# Patient Record
Sex: FEMALE | Race: WHITE | NOT HISPANIC OR LATINO | Employment: UNEMPLOYED | URBAN - METROPOLITAN AREA
[De-identification: names, ages, dates, MRNs, and addresses within clinical notes are randomized per-mention and may not be internally consistent; named-entity substitution may affect disease eponyms.]

---

## 2017-05-23 DIAGNOSIS — Z12.31 ENCOUNTER FOR SCREENING MAMMOGRAM FOR MALIGNANT NEOPLASM OF BREAST: ICD-10-CM

## 2017-05-23 DIAGNOSIS — R10.2 PELVIC AND PERINEAL PAIN: ICD-10-CM

## 2017-06-05 ENCOUNTER — ALLSCRIPTS OFFICE VISIT (OUTPATIENT)
Dept: OTHER | Facility: OTHER | Age: 47
End: 2017-06-05

## 2017-06-12 ENCOUNTER — GENERIC CONVERSION - ENCOUNTER (OUTPATIENT)
Dept: OTHER | Facility: OTHER | Age: 47
End: 2017-06-12

## 2017-07-25 ENCOUNTER — ALLSCRIPTS OFFICE VISIT (OUTPATIENT)
Dept: OTHER | Facility: OTHER | Age: 47
End: 2017-07-25

## 2017-08-03 ENCOUNTER — ALLSCRIPTS OFFICE VISIT (OUTPATIENT)
Dept: OTHER | Facility: OTHER | Age: 47
End: 2017-08-03

## 2017-08-04 ENCOUNTER — GENERIC CONVERSION - ENCOUNTER (OUTPATIENT)
Dept: OTHER | Facility: OTHER | Age: 47
End: 2017-08-04

## 2017-08-15 ENCOUNTER — GENERIC CONVERSION - ENCOUNTER (OUTPATIENT)
Dept: OTHER | Facility: OTHER | Age: 47
End: 2017-08-15

## 2017-08-25 DIAGNOSIS — Z00.00 ENCOUNTER FOR GENERAL ADULT MEDICAL EXAMINATION WITHOUT ABNORMAL FINDINGS: ICD-10-CM

## 2017-08-25 DIAGNOSIS — K91.2 POSTSURGICAL MALABSORPTION, NOT ELSEWHERE CLASSIFIED: ICD-10-CM

## 2017-08-25 DIAGNOSIS — R12 HEARTBURN: ICD-10-CM

## 2017-08-25 DIAGNOSIS — R14.0 ABDOMINAL DISTENSION (GASEOUS): ICD-10-CM

## 2017-08-25 DIAGNOSIS — Z98.84 BARIATRIC SURGERY STATUS: ICD-10-CM

## 2017-11-10 ENCOUNTER — GENERIC CONVERSION - ENCOUNTER (OUTPATIENT)
Dept: OTHER | Facility: OTHER | Age: 47
End: 2017-11-10

## 2017-11-10 ENCOUNTER — APPOINTMENT (OUTPATIENT)
Dept: LAB | Facility: MEDICAL CENTER | Age: 47
End: 2017-11-10
Payer: MEDICARE

## 2017-11-10 DIAGNOSIS — Z13.228 ENCOUNTER FOR SCREENING FOR OTHER METABOLIC DISORDERS: ICD-10-CM

## 2017-11-10 DIAGNOSIS — Z13.0 ENCOUNTER FOR SCREENING FOR DISEASES OF THE BLOOD AND BLOOD-FORMING ORGANS AND CERTAIN DISORDERS INVOLVING THE IMMUNE MECHANISM: ICD-10-CM

## 2017-11-10 DIAGNOSIS — B37.3 CANDIDIASIS OF VULVA AND VAGINA: ICD-10-CM

## 2017-11-10 DIAGNOSIS — Z13.21 ENCOUNTER FOR SCREENING FOR NUTRITIONAL DISORDER: ICD-10-CM

## 2017-11-10 DIAGNOSIS — Z13.29 ENCOUNTER FOR SCREENING FOR OTHER SUSPECTED ENDOCRINE DISORDER: ICD-10-CM

## 2017-11-10 LAB
EST. AVERAGE GLUCOSE BLD GHB EST-MCNC: 117 MG/DL
HBA1C MFR BLD: 5.7 % (ref 4.2–6.3)

## 2017-11-10 PROCEDURE — 36415 COLL VENOUS BLD VENIPUNCTURE: CPT

## 2017-11-10 PROCEDURE — 83036 HEMOGLOBIN GLYCOSYLATED A1C: CPT

## 2017-12-06 ENCOUNTER — LAB REQUISITION (OUTPATIENT)
Dept: LAB | Facility: HOSPITAL | Age: 47
End: 2017-12-06
Payer: COMMERCIAL

## 2017-12-06 ENCOUNTER — APPOINTMENT (OUTPATIENT)
Dept: LAB | Facility: MEDICAL CENTER | Age: 47
End: 2017-12-06
Payer: COMMERCIAL

## 2017-12-06 ENCOUNTER — TRANSCRIBE ORDERS (OUTPATIENT)
Dept: ADMINISTRATIVE | Facility: HOSPITAL | Age: 47
End: 2017-12-06

## 2017-12-06 ENCOUNTER — GENERIC CONVERSION - ENCOUNTER (OUTPATIENT)
Dept: OTHER | Facility: OTHER | Age: 47
End: 2017-12-06

## 2017-12-06 DIAGNOSIS — R19.4 FREQUENT BOWEL MOVEMENTS: Primary | ICD-10-CM

## 2017-12-06 DIAGNOSIS — B37.9 CANDIDIASIS: ICD-10-CM

## 2017-12-06 DIAGNOSIS — N76.0 ACUTE VAGINITIS: ICD-10-CM

## 2017-12-06 DIAGNOSIS — B37.3 CANDIDIASIS OF VULVA AND VAGINA: ICD-10-CM

## 2017-12-06 DIAGNOSIS — R19.4 FREQUENT BOWEL MOVEMENTS: ICD-10-CM

## 2017-12-06 LAB
CRP SERPL QL: 9.5 MG/L
TSH SERPL DL<=0.05 MIU/L-ACNC: 3.23 UIU/ML (ref 0.36–3.74)

## 2017-12-06 PROCEDURE — 84443 ASSAY THYROID STIM HORMONE: CPT

## 2017-12-06 PROCEDURE — 86255 FLUORESCENT ANTIBODY SCREEN: CPT

## 2017-12-06 PROCEDURE — 87510 GARDNER VAG DNA DIR PROBE: CPT | Performed by: NURSE PRACTITIONER

## 2017-12-06 PROCEDURE — 86140 C-REACTIVE PROTEIN: CPT

## 2017-12-06 PROCEDURE — 82784 ASSAY IGA/IGD/IGG/IGM EACH: CPT

## 2017-12-06 PROCEDURE — 87660 TRICHOMONAS VAGIN DIR PROBE: CPT | Performed by: NURSE PRACTITIONER

## 2017-12-06 PROCEDURE — 36415 COLL VENOUS BLD VENIPUNCTURE: CPT

## 2017-12-06 PROCEDURE — 83516 IMMUNOASSAY NONANTIBODY: CPT

## 2017-12-06 PROCEDURE — 87480 CANDIDA DNA DIR PROBE: CPT | Performed by: NURSE PRACTITIONER

## 2017-12-07 LAB
ENDOMYSIUM IGA SER QL: NEGATIVE
GLIADIN PEPTIDE IGA SER-ACNC: 4 UNITS (ref 0–19)
GLIADIN PEPTIDE IGG SER-ACNC: 1 UNITS (ref 0–19)
IGA SERPL-MCNC: 177 MG/DL (ref 87–352)
TTG IGA SER-ACNC: <2 U/ML (ref 0–3)
TTG IGG SER-ACNC: <2 U/ML (ref 0–5)

## 2017-12-08 LAB
CANDIDA RRNA VAG QL PROBE: NEGATIVE
G VAGINALIS RRNA GENITAL QL PROBE: POSITIVE
T VAGINALIS RRNA GENITAL QL PROBE: NEGATIVE

## 2017-12-11 ENCOUNTER — GENERIC CONVERSION - ENCOUNTER (OUTPATIENT)
Dept: OTHER | Facility: OTHER | Age: 47
End: 2017-12-11

## 2017-12-20 ENCOUNTER — GENERIC CONVERSION - ENCOUNTER (OUTPATIENT)
Dept: OTHER | Facility: OTHER | Age: 47
End: 2017-12-20

## 2018-01-09 NOTE — PROGRESS NOTES
Message  Outreach phone call; no response but left message for return phone call  Would like to schedule appointment with CHAPITO and RD  NV      Active Problems    1  Abdominal bloating (787 3) (R14 0)   2  Anxiety (300 00) (F41 9)   3  Bipolar disorder (296 80) (F31 9)   4  Blood pressure elevated (401 9) (I10)   5  Candidiasis (112 9) (B37 9)   6  Depression (311) (F32 9)   7  Disc degeneration, lumbar (722 52) (M51 36)   8  Encounter for gynecological examination without abnormal finding (V72 31) (Z01 419)   9  Encounter for mammogram to establish baseline mammogram (V76 12) (Z12 31)   10  Encounter for screening mammogram for malignant neoplasm of breast (V76 12)    (Z12 31)   11  Esophageal reflux (530 81) (K21 9)   12  Fatigue (780 79) (R53 83)   13  Fibromyalgia (729 1) (M79 7)   14  Hearing Loss (389 9)   15  Heart burn (787 1) (R12)   16  Hypothyroidism (244 9) (E03 9)   17  Incisional irritation (998 89) (T81 89XA)   18  Joint pain, knee (719 46) (M25 569)   19  Lichen sclerosus (408 7) (L90 0)   20  Obesity (278 00) (E66 9)   21  Obesity surgery status (V45 86) (Z98 84)   22  Pilonidal cyst (685 1) (L05 91)   23  Postgastrectomy malabsorption (579 3) (K91 2,Z90 3)   24  Tingling (782 0) (R20 2)   25  Varicose Veins Of Lower Extremities (454 9)   26  Weight gain (783 1) (R63 5)    Current Meds   1  ALPRAZolam 0 25 MG Oral Tablet; 1-4 daily prn; Therapy: (Recorded:28Plp2560) to Recorded   2  Biotin TABS; Therapy: (Recorded:77Yoc0504) to Recorded   3  Calcium 600+D 600-400 MG-UNIT Oral Tablet Recorded   4  Clobetasol Propionate 0 05 % External Ointment; APPLY AND GENTLY MASSAGE INTO   AFFECTED AREA(S) TWICE DAILY; Therapy: 82CII6427 to (Last Rx:86Bsb3378)  Requested for: 45YQL1791 Ordered   5  [facility] Miconazole 7 2 % Vaginal Cream;   Therapy: 86OZJ5908 to Recorded   6  Dexilant 60 MG Oral Capsule Delayed Release; TAKE 1 CAPSULE DAILY EVERY   MORNING BEFORE BREAKFAST;    Therapy: 42JNM9810 to (Complete:10Utp5115)  Requested for: 04IRB9346; Last   Rx:25Jul2017 Ordered   7  Fluconazole 150 MG Oral Tablet (Diflucan); diflucan 150mg,,,take 1 today and repeat in   3 days; Therapy: 13RKN8918 to (Last Rx:05Jun2017)  Requested for: 94ABL1852 Ordered   8  HydroCHLOROthiazide 25 MG Oral Tablet; One Daily; Therapy: (Recorded:25Nov2013) to Recorded   9  Multivitamins CAPS; Therapy: (Recorded:19Dec2013) to Recorded   10  Nystatin-Triamcinolone 516172-6 1 UNIT/GM-% External Ointment; APPLY SPARINGLY    TO AFFECTED AREA(S) TWICE DAILY; Therapy: 19BJX3446 to (Last Rx:05Jun2017)  Requested for: 97TIY4986    Ordered   11  Omega 3 CAPS; Therapy: (Recorded:25Jul2017) to Recorded   12  Sertraline HCl - 50 MG Oral Tablet; TAKE 1 TABLET DAILY; Therapy: (Recorded:25Nov2013) to Recorded   13  Tramadol-Acetaminophen 37 5-325 MG Oral Tablet; Therapy: 12Apr2017 to (Evaluate:05Jul2017) Recorded   14  Vitamin B-12 SUBL; Therapy: (Recorded:19Dec2013) to Recorded   15  Vitamin C TABS; Therapy: (Recorded:11Mar2015) to Recorded   16  Vitamin D3 TABS; Therapy: (Recorded:11Mar2015) to Recorded    Allergies    1   Penicillins    Signatures   Electronically signed by : ANDREA Govea; Aug 15 2017  3:07PM EST                       (Author)

## 2018-01-10 NOTE — PROGRESS NOTES
Message  Outreach phone call; patient upset because she was not seen by RD on 8/3/17, and she has to wait 3 months to meet with Dr Santos Arriola or PA  She is unsure if she will continue with [facility]  She is considering going to [facility]  I apologized to patient for the incident and made myself available however patient was in no place to hear anything from me  [place]      Active Problems    1  Abdominal bloating (787 3) (R14 0)   2  Anxiety (300 00) (F41 9)   3  Bipolar disorder (296 80) (F31 9)   4  Blood pressure elevated (401 9) (I10)   5  Candidiasis (112 9) (B37 9)   6  Depression (311) (F32 9)   7  Disc degeneration, lumbar (722 52) (M51 36)   8  Encounter for gynecological examination without abnormal finding (V72 31) (Z01 419)   9  Encounter for mammogram to establish baseline mammogram (V76 12) (Z12 31)   10  Encounter for screening mammogram for malignant neoplasm of breast (V76 12)    (Z12 31)   11  Esophageal reflux (530 81) (K21 9)   12  Fatigue (780 79) (R53 83)   13  Fibromyalgia (729 1) (M79 7)   14  Hearing Loss (389 9)   15  Heart burn (787 1) (R12)   16  Hypothyroidism (244 9) (E03 9)   17  Incisional irritation (998 89) (T81 89XA)   18  Joint pain, knee (719 46) (M25 569)   19  Lichen sclerosus (247 5) (L90 0)   20  Obesity (278 00) (E66 9)   21  Obesity surgery status (V45 86) (Z98 84)   22  Pilonidal cyst (685 1) (L05 91)   23  Postgastrectomy malabsorption (579 3) (K91 2,Z90 3)   24  Tingling (782 0) (R20 2)   25  Varicose Veins Of Lower Extremities (454 9)   26  Weight gain (783 1) (R63 5)    Current Meds   1  ALPRAZolam 0 25 MG Oral Tablet; 1-4 daily prn; Therapy: (Recorded:32Jjf6225) to Recorded   2  Biotin TABS; Therapy: (Recorded:29Ujt4964) to Recorded   3  Calcium 600+D 600-400 MG-UNIT Oral Tablet Recorded   4  Clobetasol Propionate 0 05 % External Ointment; APPLY AND GENTLY MASSAGE INTO   AFFECTED AREA(S) TWICE DAILY;    Therapy: 90NUL9946 to (Last SF:29COV4406)  Requested for: 57OFX4451 Ordered   5  [facility] Miconazole 7 2 % Vaginal Cream;   Therapy: 74PTM5355 to Recorded   6  Dexilant 60 MG Oral Capsule Delayed Release; TAKE 1 CAPSULE DAILY EVERY   MORNING BEFORE BREAKFAST; Therapy: 33VMR5974 to (Complete:08Xcv8452)  Requested for: 50YEK9521; Last   Rx:25Jul2017 Ordered   7  Fluconazole 150 MG Oral Tablet (Diflucan); diflucan 150mg,,,take 1 today and repeat in   3 days; Therapy: 38QDF8318 to (Last Rx:05Jun2017)  Requested for: 20ENE0275 Ordered   8  HydroCHLOROthiazide 25 MG Oral Tablet; One Daily; Therapy: (Recorded:25Nov2013) to Recorded   9  Multivitamins CAPS; Therapy: (Recorded:19Dec2013) to Recorded   10  Nystatin-Triamcinolone 170069-8 1 UNIT/GM-% External Ointment; APPLY SPARINGLY    TO AFFECTED AREA(S) TWICE DAILY; Therapy: 43ZZS7629 to (Last Rx:05Jun2017)  Requested for: 59SBY9072    Ordered   11  Omega 3 CAPS; Therapy: (Recorded:25Jul2017) to Recorded   12  Sertraline HCl - 50 MG Oral Tablet; TAKE 1 TABLET DAILY; Therapy: (Recorded:25Nov2013) to Recorded   13  Tramadol-Acetaminophen 37 5-325 MG Oral Tablet; Therapy: 12Apr2017 to (Evaluate:05Jul2017) Recorded   14  Vitamin B-12 SUBL; Therapy: (Recorded:19Dec2013) to Recorded   15  Vitamin C TABS; Therapy: (Recorded:11Mar2015) to Recorded   16  Vitamin D3 TABS; Therapy: (Recorded:11Mar2015) to Recorded    Allergies    1   Penicillins    Signatures   Electronically signed by : ANDREA Sherman; Aug  4 2017  2:47PM EST                       (Author)

## 2018-01-12 NOTE — MISCELLANEOUS
Provider Comments  Provider Comments:     Dear Rosalva Gaviria had a scheduled appointment at our office today but were unable to keep  We attempted to call you back but were unable to reach you  It is very important that you follow up with us so that we can assess your physical and nutritional safety after your surgery  Please call our office at [phone] to reschedule your appointment       Sincerely,     [facility]              Signatures   Electronically signed by : Kan Uriarte, ; Mar 14 2016  9:01AM EST                       (Author)    Electronically signed by : Mart Hanks, [facility]; Mar 14 2016  2:23PM EST                       (Author)

## 2018-01-13 VITALS
HEART RATE: 72 BPM | BODY MASS INDEX: 35.84 KG/M2 | WEIGHT: 223 LBS | HEIGHT: 66 IN | RESPIRATION RATE: 20 BRPM | SYSTOLIC BLOOD PRESSURE: 102 MMHG | DIASTOLIC BLOOD PRESSURE: 70 MMHG | TEMPERATURE: 97 F

## 2018-01-13 VITALS
HEIGHT: 66 IN | DIASTOLIC BLOOD PRESSURE: 80 MMHG | WEIGHT: 220 LBS | BODY MASS INDEX: 35.36 KG/M2 | SYSTOLIC BLOOD PRESSURE: 110 MMHG

## 2018-01-13 VITALS
RESPIRATION RATE: 22 BRPM | HEIGHT: 66 IN | DIASTOLIC BLOOD PRESSURE: 78 MMHG | WEIGHT: 225.5 LBS | HEART RATE: 74 BPM | SYSTOLIC BLOOD PRESSURE: 104 MMHG | BODY MASS INDEX: 36.24 KG/M2 | TEMPERATURE: 97.2 F

## 2018-01-15 NOTE — MISCELLANEOUS
Message   Recorded as Task   Date: 04/15/2016 10:26 AM, Created By: Eduarda Tellez   Task Name: Follow Up   Assigned To: Alo Pan   Regarding Patient: Aj Talbot, Status: In Progress   Comment:    Naima Alonso - 15 Apr 2016 10:26 AM     TASK CREATED  Caller: Self; [phone] (Home)  pt has had pain and pressure w/ urinating and itching/burning    Joanne Folk now is getting sore please advise pt @ [phone] rx [facility]   LindaAguila - 15 Apr 2016 10:46 AM     TASK IN PROGRESS   LindaAguila - 15 Apr 2016 11:05 AM     TASK EDITED  spoke with pt- advised she go to [facility] today to r/o UTI   annual exam with artur green -[place] office, per pts' request   (pt has not been seen since 12/2014), due for an annual & has gyn questions/concerns        Active Problems    1  Anxiety (300 00) (F41 9)   2  Bipolar disorder (296 80) (F31 9)   3  Blood pressure elevated (401 9) (I10)   4  Candidiasis (112 9) (B37 9)   5  Depression (311) (F32 9)   6  Disc degeneration, lumbar (722 52) (M51 36)   7  Encounter for mammogram to establish baseline mammogram (V76 12) (Z12 31)   8  Encounter for routine gynecological examination (V72 31) (Z01 419)   9  Encounter for screening mammogram for malignant neoplasm of breast (V76 12)   (Z12 31)   10  Esophageal reflux (530 81) (K21 9)   11  Fatigue (780 79) (R53 83)   12  Furuncle of groin (680 2) (L02 224)   13  Hearing Loss (389 9)   14  Hypothyroidism (244 9) (E03 9)   15  Incisional irritation (998 89) (T81 89XA)   16  Joint pain, knee (719 46) (M25 569)   17  Obesity (278 00) (E66 9)   18  Obesity surgery status (V45 86) (Z98 84)   19  Painful urination (788 1) (R30 9)   20  Pilonidal cyst (685 1) (L05 91)   21  Postgastrectomy malabsorption (579 3) (K91 2,Z90 3)   22  Pre-operative cardiovascular examination (V72 81) (Z01 810)   23  Tingling (782 0) (R20 2)   24  Umbilical discharge (923 2) (R19 8)   25   Varicose Veins Of Lower Extremities (454 9) 26  Vulvitis (616 10) (N76 2)    Current Meds   1  ALPRAZolam 0 25 MG Oral Tablet; 1-4 daily prn; Therapy: (Recorded:25Nov2013) to Recorded   2  Butalbital-APAP-Caffeine -40 MG Oral Tablet; Therapy: 23WGE1086 to (Evaluate:16Mar2015) Recorded   3  Calcium 600+D 600-400 MG-UNIT Oral Tablet Recorded   4  Folic Acid TABS; Therapy: (Recorded:11Mar2015) to Recorded   5  Hydrochlorothiazide 25 MG Oral Tablet; One Daily; Therapy: (Recorded:25Nov2013) to Recorded   6  Magnesium CAPS; Therapy: (Recorded:11Mar2015) to Recorded   7  Multivitamins CAPS; Therapy: (Recorded:19Dec2013) to Recorded   8  Omeprazole 20 MG Oral Capsule Delayed Release; TAKE 1 CAPSULE DAILY; Therapy: 29WTM6710 to (Evaluate:11Suz7506)  Requested for: 86AUR3640; Last   Rx:90Vpo7913 Ordered   9  Sertraline HCl - 100 MG Oral Tablet; Therapy: 35YUR1773 to (Evaluate:63Lnu1946) Recorded   10  Sertraline HCl - 50 MG Oral Tablet; TAKE 1 TABLET DAILY; Therapy: (Recorded:25Nov2013) to Recorded   11  Vitamin B-12 SUBL; Therapy: (Recorded:19Dec2013) to Recorded   12  Vitamin C TABS; Therapy: (Recorded:11Mar2015) to Recorded   13  Vitamin D3 TABS; Therapy: (Recorded:11Mar2015) to Recorded    Allergies    1   Penicillins    Signatures   Electronically signed by : Cherylene Hang, RN; Apr 15 2016 11:05AM EST                       (Author)

## 2018-01-16 NOTE — MISCELLANEOUS
Message   Recorded as Task   Date: 05/23/2017 02:03 PM, Created By: Mallorie Sagastume   Task Name: Call Back   Assigned To: Brionna Figueroa   Regarding Patient: Bhavin Lin, Status: In Progress   Comment:    Shari Thorne - 23 May 2017 2:03 PM     TASK CREATED  PT WANTS AN UPDATED SCRIPT FOR A PELVIC U/C NON OB COMPLETE BUT SHE'S ASKING TO ALSO HAVE A TRANSVAGINAL ONE  Eulalio Rodriguez ORDERED BUT I DON'T SEE THIS IN THE SYSTEM  SHE'S GOING TO [facility] -[phone]  HER # [phone]   Sherri Leon - 23 May 2017 2:04 PM     TASK IN PROGRESS   Sherri Leon - 23 May 2017 2:20 PM     TASK EDITED  order placed in allscripts for pelvic u/s with transvaginal  slip sent to pt        Active Problems    1  Anxiety (300 00) (F41 9)   2  Bipolar disorder (296 80) (F31 9)   3  Blood pressure elevated (401 9) (I10)   4  Candidiasis (112 9) (B37 9)   5  Depression (311) (F32 9)   6  Disc degeneration, lumbar (722 52) (M51 36)   7  Encounter for mammogram to establish baseline mammogram (V76 12) (Z12 31)   8  Encounter for routine gynecological examination (V72 31) (Z01 419)   9  Encounter for screening mammogram for malignant neoplasm of breast (V76 12)   (Z12 31)   10  Esophageal reflux (530 81) (K21 9)   11  Fatigue (780 79) (R53 83)   12  Furuncle of groin (680 2) (L02 224)   13  Hearing Loss (389 9)   14  Hypothyroidism (244 9) (E03 9)   15  Incisional irritation (998 89) (T81 89XA)   16  Joint pain, knee (719 46) (M25 569)   17  Lichen sclerosus (052 1) (L90 0)   18  Obesity (278 00) (E66 9)   19  Obesity surgery status (V45 86) (Z98 84)   20  Painful urination (788 1) (R30 9)   21  Pelvic pain in female (625 9) (R10 2)   22  Pilonidal cyst (685 1) (L05 91)   23  Postgastrectomy malabsorption (579 3) (K91 2,Z90 3)   24  Pre-operative cardiovascular examination (V72 81) (Z01 810)   25  Tingling (782 0) (R20 2)   26  Umbilical discharge (303 9) (R19 8)   27  Varicose Veins Of Lower Extremities (454 9)   28   Vulvitis (616 10) (N76 2)    Current Meds   1  ALPRAZolam 0 25 MG Oral Tablet; 1-4 daily prn; Therapy: (Recorded:25Nov2013) to Recorded   2  Butalbital-APAP-Caffeine -40 MG Oral Tablet; Therapy: 87JSI4604 to (Evaluate:16Mar2015) Recorded   3  Calcium 600+D 600-400 MG-UNIT Oral Tablet Recorded   4  Clobetasol Propionate 0 05 % External Ointment; APPLY AND GENTLY MASSAGE INTO   AFFECTED AREA(S) TWICE DAILY; Therapy: 29DYU7227 to (Last Rx:75Hdk9017)  Requested for: 99NYP7688 Ordered   5  Folic Acid TABS; Therapy: (Recorded:11Mar2015) to Recorded   6  HydroCHLOROthiazide 25 MG Oral Tablet; One Daily; Therapy: (Recorded:25Nov2013) to Recorded   7  Multivitamins CAPS; Therapy: (Recorded:19Dec2013) to Recorded   8  Omeprazole 20 MG Oral Capsule Delayed Release; TAKE 1 CAPSULE DAILY; Therapy: 87QVE4082 to (Evaluate:70Vby6233)  Requested for: 02ZAQ4660; Last   Rx:34Tak1201 Ordered   9  Sertraline HCl - 50 MG Oral Tablet; TAKE 1 TABLET DAILY; Therapy: (Recorded:25Nov2013) to Recorded   10  Vitamin B-12 SUBL; Therapy: (Recorded:19Dec2013) to Recorded   11  Vitamin C TABS; Therapy: (Recorded:11Mar2015) to Recorded   12  Vitamin D3 TABS; Therapy: (Recorded:11Mar2015) to Recorded    Allergies    1  Penicillins    Plan  Pelvic pain in female    · * US PELVIS COMPLETE (TRANSABDOMINAL AND TRANSVAGINAL); Status:Hold For -  Scheduling,Retrospective By Protocol Authorization;  Requested for:21Ayu9006;     Signatures   Electronically signed by : Osman Arias, ; May 23 2017  2:20PM EST                       (Author)

## 2018-01-17 NOTE — MISCELLANEOUS
Message   Recorded as Task   Date: 05/23/2017 02:25 PM, Created By: Elizabeth Arroyo   Task Name: Call Back   Assigned To: Petra Jj   Regarding Patient: Rl Todd, Status: Active   Comment:    Sherri Leon - 23 May 2017 2:25 PM     TASK CREATED  slip for pelvic u/s faxed to [facility] at [phone]        Active Problems    1  Anxiety (300 00) (F41 9)   2  Bipolar disorder (296 80) (F31 9)   3  Blood pressure elevated (401 9) (I10)   4  Candidiasis (112 9) (B37 9)   5  Depression (311) (F32 9)   6  Disc degeneration, lumbar (722 52) (M51 36)   7  Encounter for mammogram to establish baseline mammogram (V76 12) (Z12 31)   8  Encounter for routine gynecological examination (V72 31) (Z01 419)   9  Encounter for screening mammogram for malignant neoplasm of breast (V76 12)   (Z12 31)   10  Esophageal reflux (530 81) (K21 9)   11  Fatigue (780 79) (R53 83)   12  Furuncle of groin (680 2) (L02 224)   13  Hearing Loss (389 9)   14  Hypothyroidism (244 9) (E03 9)   15  Incisional irritation (998 89) (T81 89XA)   16  Joint pain, knee (719 46) (M25 569)   17  Lichen sclerosus (061 0) (L90 0)   18  Obesity (278 00) (E66 9)   19  Obesity surgery status (V45 86) (Z98 84)   20  Painful urination (788 1) (R30 9)   21  Pelvic pain in female (625 9) (R10 2)   22  Pilonidal cyst (685 1) (L05 91)   23  Postgastrectomy malabsorption (579 3) (K91 2,Z90 3)   24  Pre-operative cardiovascular examination (V72 81) (Z01 810)   25  Tingling (782 0) (R20 2)   26  Umbilical discharge (578 9) (R19 8)   27  Varicose Veins Of Lower Extremities (454 9)   28  Vulvitis (616 10) (N76 2)    Current Meds   1  ALPRAZolam 0 25 MG Oral Tablet; 1-4 daily prn; Therapy: (Recorded:25Nov2013) to Recorded   2  Butalbital-APAP-Caffeine -40 MG Oral Tablet; Therapy: 26YTD6936 to (Evaluate:16Mar2015) Recorded   3  Calcium 600+D 600-400 MG-UNIT Oral Tablet Recorded   4   Clobetasol Propionate 0 05 % External Ointment; APPLY AND GENTLY MASSAGE INTO AFFECTED AREA(S) TWICE DAILY; Therapy: 41KMJ0703 to (Last Rx:54Cdv1969)  Requested for: 93ZNY3586 Ordered   5  Folic Acid TABS; Therapy: (Recorded:11Mar2015) to Recorded   6  HydroCHLOROthiazide 25 MG Oral Tablet; One Daily; Therapy: (Recorded:25Nov2013) to Recorded   7  Multivitamins CAPS; Therapy: (Recorded:14Xcq9283) to Recorded   8  Omeprazole 20 MG Oral Capsule Delayed Release; TAKE 1 CAPSULE DAILY; Therapy: 72GZO3028 to (Evaluate:52Hbv0085)  Requested for: 17TIU0816; Last   Rx:70Qay6291 Ordered   9  Sertraline HCl - 50 MG Oral Tablet; TAKE 1 TABLET DAILY; Therapy: (Recorded:25Nov2013) to Recorded   10  Vitamin B-12 SUBL; Therapy: (Recorded:19Dec2013) to Recorded   11  Vitamin C TABS; Therapy: (Recorded:11Mar2015) to Recorded   12  Vitamin D3 TABS; Therapy: (Recorded:11Mar2015) to Recorded    Allergies    1  Penicillins    Plan  Pelvic pain in female    · * US PELVIS COMPLETE (TRANSABDOMINAL AND TRANSVAGINAL); Status:Hold For -  Scheduling,Retrospective By Protocol Authorization;  Requested for:97Efc2405;     Signatures   Electronically signed by : Robbie Almodovar, ; May 23 2017  2:25PM EST                       (Author)

## 2018-01-22 VITALS
DIASTOLIC BLOOD PRESSURE: 82 MMHG | HEIGHT: 66 IN | SYSTOLIC BLOOD PRESSURE: 126 MMHG | BODY MASS INDEX: 36 KG/M2 | WEIGHT: 224 LBS

## 2018-01-23 NOTE — MISCELLANEOUS
Message   Recorded as Task   Date: 12/20/2017 01:25 PM, Created By: Tracy Celaya   Task Name: Follow Up   Assigned To: Fan Rollins   Regarding Patient: Maryuri Tse, Status: In Progress   Comment:    Tracy Moralez - 20 Dec 2017 1:25 PM     TASK CREATED  Caller: Self; [phone] (Home); [phone] (Work)  pt took her meds for her yeast inf and is getting itchy again what to do ,call her at [address] [place] [address] - 20 Dec 2017 1:29 PM     TASK IN PROGRESS   Roselia Parrish - 20 Dec 2017 1:39 PM     TASK EDITED  Pt has external itching on entire vulva and irritation ( where lichens is)  Pt will use mycolog on vulva and use clobetesol on lichens  Use mycology for 3 to 4 days and clobetesol for 2 weeks  TCB if not better next week        Active Problems    1  Abdominal bloating (787 3) (R14 0)   2  Anxiety (300 00) (F41 9)   3  Bipolar disorder (296 80) (F31 9)   4  Blood pressure elevated (401 9) (I10)   5  BV (bacterial vaginosis) (616 10,041 9) (N76 0,B96 89)   6  Candidiasis (112 9) (B37 9)   7  Candidiasis of vulva (112 1) (B37 3)   8  Depression (311) (F32 9)   9  Disc degeneration, lumbar (722 52) (M51 36)   10  Esophageal reflux (530 81) (K21 9)   11  Fatigue (780 79) (R53 83)   12  Fibromyalgia (729 1) (M79 7)   13  Hearing Loss (389 9)   14  Heart burn (787 1) (R12)   15  Hypothyroidism (244 9) (E03 9)   16  Incisional irritation (998 89) (T81 89XA)   17  Joint pain, knee (719 46) (M25 569)   18  Lichen sclerosus (066 8) (L90 0)   19  Obesity (278 00) (E66 9)   20  Obesity surgery status (V45 86) (Z98 84)   21  Perimenopausal vasomotor symptoms (627 2) (N95 1)   22  Pilonidal cyst (685 1) (L05 91)   23  Postgastrectomy malabsorption (579 3) (K91 2,Z90 3)   24  Recurrent candidiasis of vagina (112 1) (B37 3)   25  Screening for endocrine, nutritional, metabolic and immunity disorder (V77 99)    (Z13 29,Z13 0,Z13 21,Z13 228)   26  Tingling (782 0) (R20 2)   27   Varicose Veins Of Lower Extremities (454 9)   28  Weight gain (783 1) (R63 5)    Current Meds   1  ALPRAZolam 0 25 MG Oral Tablet; 1-4 daily prn; Therapy: (Recorded:25Nov2013) to Recorded   2  Biotin TABS; Therapy: (Recorded:66Iut7626) to Recorded   3  Calcium 600+D 600-400 MG-UNIT Oral Tablet Recorded   4  Clobetasol Propionate 0 05 % External Ointment; APPLY AND GENTLY MASSAGE INTO   AFFECTED AREA(S) TWICE DAILY; Therapy: 01DVY5664 to (Last Rx:02Svv9625)  Requested for: 47TMA6385 Ordered   5  Fluconazole 150 MG Oral Tablet; diflucan 150mg,,,take 1 today and repeat in 3 days; Therapy: 62YLU3697 to (Evaluate:87Nvp1752)  Requested for: 78PCO6151; Last   Rx:52Yqr4273 Ordered   6  HydroCHLOROthiazide 25 MG Oral Tablet; One Daily; Therapy: (Recorded:25Nov2013) to Recorded   7  MetroNIDAZOLE 500 MG Oral Tablet; Take 1 tablet twice daily; Therapy: 93VZY7654 to (Evaluate:57Asw7953)  Requested for: 59Svd5312; Last   Rx:40Alq6539 Ordered   8  Multivitamins CAPS; Therapy: (Recorded:65Qnm6416) to Recorded   9  Nystatin-Triamcinolone 138891-9 1 UNIT/GM-% External Cream; APPLY SPARINGLY TO   AFFECTED AREA(S) 4 TIMES A DAY; Therapy: 32SWE2509 to (Cindy Styles)  Requested for: 76ESX9967; Last   Rx:10Nov2017 Ordered   10  Sertraline HCl - 50 MG Oral Tablet; TAKE 1 TABLET DAILY; Therapy: (Recorded:25Nov2013) to Recorded   11  Vitamin B-12 SUBL; Therapy: (Recorded:29Ptr2604) to Recorded   12  Vitamin C TABS; Therapy: (Recorded:11Mar2015) to Recorded   13  Vitamin D3 TABS; Therapy: (Recorded:11Mar2015) to Recorded    Allergies    1  Penicillins    Signatures   Electronically signed by :  Mariposa Brown, ; Dec 20 2017  1:39PM EST                       (Author)

## 2018-01-23 NOTE — MISCELLANEOUS
Message   Recorded as Task   Date: 12/11/2017 07:53 AM, Created By: Shoshana Galo   Task Name: Result Follow Up   Assigned To: Mel Sousa   Regarding Patient: Obdulia Franco, Status: In Progress   Kaleigh Packer - 11 Dec 2017 7:53 AM     TASK CREATED  Affirm with BV   Candida is neg however I would advise that she completes these meds because she did have some external sx   Please advise that she should complete a course of Flagyl  Please review antabuse effects and recommend pelvic rest until meds are completed  Thanks   Anna Marie Romero - 11 Dec 2017 8:38 AM     TASK IN PROGRESS   Anna Marie Romero - 11 Dec 2017 8:40 AM     TASK EDITED  [facility] for pt to cb       ext: [id number]   Anna Marie Romero - 11 Dec 2017 9:23 AM     TASK EDITED  Patient returned call - she is aware of culture results and that an RX was sent to her pharmacy - Gave directions on use and the precautions  If still feeling sx 48-72 hrs after last dose to call back  Active Problems    1  Abdominal bloating (787 3) (R14 0)   2  Anxiety (300 00) (F41 9)   3  Bipolar disorder (296 80) (F31 9)   4  Blood pressure elevated (401 9) (I10)   5  BV (bacterial vaginosis) (616 10,041 9) (N76 0,B96 89)   6  Candidiasis (112 9) (B37 9)   7  Candidiasis of vulva (112 1) (B37 3)   8  Depression (311) (F32 9)   9  Disc degeneration, lumbar (722 52) (M51 36)   10  Esophageal reflux (530 81) (K21 9)   11  Fatigue (780 79) (R53 83)   12  Fibromyalgia (729 1) (M79 7)   13  Hearing Loss (389 9)   14  Heart burn (787 1) (R12)   15  Hypothyroidism (244 9) (E03 9)   16  Incisional irritation (998 89) (T81 89XA)   17  Joint pain, knee (719 46) (M25 569)   18  Lichen sclerosus (374 4) (L90 0)   19  Obesity (278 00) (E66 9)   20  Obesity surgery status (V45 86) (Z98 84)   21  Perimenopausal vasomotor symptoms (627 2) (N95 1)   22  Pilonidal cyst (685 1) (L05 91)   23  Postgastrectomy malabsorption (579 3) (K91 2,Z90 3)   24   Recurrent candidiasis of vagina (112  1) (B37 3)   25  Screening for endocrine, nutritional, metabolic and immunity disorder (V77 99)    (Z13 29,Z13 0,Z13 21,Z13 228)   26  Tingling (782 0) (R20 2)   27  Varicose Veins Of Lower Extremities (454 9)   28  Weight gain (783 1) (R63 5)    Current Meds   1  ALPRAZolam 0 25 MG Oral Tablet; 1-4 daily prn; Therapy: (Recorded:25Nov2013) to Recorded   2  Biotin TABS; Therapy: (Recorded:71Sah7678) to Recorded   3  Calcium 600+D 600-400 MG-UNIT Oral Tablet Recorded   4  Clobetasol Propionate 0 05 % External Ointment; APPLY AND GENTLY MASSAGE INTO   AFFECTED AREA(S) TWICE DAILY; Therapy: 62DZN7496 to (Last Rx:49Ajm5963)  Requested for: 52ZCJ6491 Ordered   5  Fluconazole 150 MG Oral Tablet; diflucan 150mg,,,take 1 today and repeat in 3 days; Therapy: 61PGN0567 to (Evaluate:93Exc5418)  Requested for: 43KCU2496; Last   Rx:68Lbp8643 Ordered   6  HydroCHLOROthiazide 25 MG Oral Tablet; One Daily; Therapy: (Recorded:25Nov2013) to Recorded   7  MetroNIDAZOLE 500 MG Oral Tablet; Take 1 tablet twice daily; Therapy: 98JNH9823 to (Evaluate:66Yau5075)  Requested for: 00Gru8675; Last   Rx:65Otz6999 Ordered   8  Multivitamins CAPS; Therapy: (Recorded:03Mky0531) to Recorded   9  Nystatin-Triamcinolone 962651-2 1 UNIT/GM-% External Cream; APPLY SPARINGLY TO   AFFECTED AREA(S) 4 TIMES A DAY; Therapy: 13QTJ2514 to (Matthew Salazar)  Requested for: 57GTD4025; Last   Rx:10Nov2017 Ordered   10  Sertraline HCl - 50 MG Oral Tablet; TAKE 1 TABLET DAILY; Therapy: (Recorded:25Nov2013) to Recorded   11  Vitamin B-12 SUBL; Therapy: (Recorded:66Jvf4605) to Recorded   12  Vitamin C TABS; Therapy: (Recorded:11Mar2015) to Recorded   13  Vitamin D3 TABS; Therapy: (Recorded:11Mar2015) to Recorded    Allergies    1   Penicillins    Signatures   Electronically signed by : Gilford Jourdain, ; Dec 11 2017  9:23AM EST                       (Author)

## 2018-01-24 VITALS
SYSTOLIC BLOOD PRESSURE: 112 MMHG | WEIGHT: 220 LBS | HEIGHT: 66 IN | BODY MASS INDEX: 35.36 KG/M2 | DIASTOLIC BLOOD PRESSURE: 80 MMHG

## 2018-07-16 RX ORDER — METRONIDAZOLE 500 MG/1
1 TABLET ORAL 2 TIMES DAILY
COMMUNITY
Start: 2017-11-10 | End: 2018-11-29 | Stop reason: ALTCHOICE

## 2018-07-16 RX ORDER — ASCORBATE CALCIUM 500 MG
500 TABLET ORAL
COMMUNITY
End: 2018-11-29

## 2018-07-16 RX ORDER — ZOLPIDEM TARTRATE 10 MG/1
10 TABLET ORAL DAILY
COMMUNITY
Start: 2018-05-29

## 2018-07-16 RX ORDER — ALPRAZOLAM 0.25 MG/1
TABLET ORAL
Refills: 3 | COMMUNITY
Start: 2018-04-19

## 2018-07-16 RX ORDER — SERTRALINE HYDROCHLORIDE 100 MG/1
200 TABLET, FILM COATED ORAL DAILY
Refills: 1 | COMMUNITY
Start: 2018-04-13 | End: 2018-11-29 | Stop reason: ALTCHOICE

## 2018-07-16 RX ORDER — OMEPRAZOLE 10 MG/1
20 CAPSULE, DELAYED RELEASE ORAL
COMMUNITY
Start: 2017-12-18 | End: 2018-11-29 | Stop reason: ALTCHOICE

## 2018-07-16 RX ORDER — NICOTINE POLACRILEX 2 MG
GUM BUCCAL
COMMUNITY
End: 2018-11-29

## 2018-07-16 RX ORDER — FLUCONAZOLE 150 MG/1
150 TABLET ORAL
COMMUNITY
Start: 2017-12-06 | End: 2018-11-29 | Stop reason: ALTCHOICE

## 2018-07-16 RX ORDER — SACCHAROMYCES BOULARDII 250 MG
CAPSULE ORAL
COMMUNITY

## 2018-07-16 RX ORDER — ASCORBIC ACID 100 MG
TABLET,CHEWABLE ORAL
COMMUNITY
End: 2018-11-29

## 2018-07-16 RX ORDER — UBIDECARENONE 75 MG
CAPSULE ORAL
COMMUNITY
End: 2018-11-29

## 2018-11-29 ENCOUNTER — OFFICE VISIT (OUTPATIENT)
Dept: OBGYN CLINIC | Facility: MEDICAL CENTER | Age: 48
End: 2018-11-29
Payer: COMMERCIAL

## 2018-11-29 VITALS
WEIGHT: 230.8 LBS | BODY MASS INDEX: 38.45 KG/M2 | HEIGHT: 65 IN | DIASTOLIC BLOOD PRESSURE: 86 MMHG | SYSTOLIC BLOOD PRESSURE: 124 MMHG

## 2018-11-29 DIAGNOSIS — L72.3 SEBACEOUS CYST: Primary | ICD-10-CM

## 2018-11-29 DIAGNOSIS — B37.9 CANDIDIASIS: ICD-10-CM

## 2018-11-29 PROCEDURE — 99213 OFFICE O/P EST LOW 20 MIN: CPT | Performed by: NURSE PRACTITIONER

## 2018-11-29 RX ORDER — DULOXETIN HYDROCHLORIDE 20 MG/1
20 CAPSULE, DELAYED RELEASE ORAL DAILY
Refills: 1 | COMMUNITY
Start: 2018-11-20

## 2018-11-29 RX ORDER — FAMOTIDINE 20 MG/1
20 TABLET, FILM COATED ORAL 2 TIMES DAILY
Refills: 0 | COMMUNITY
Start: 2018-11-07

## 2018-11-29 RX ORDER — HYDROXYZINE HYDROCHLORIDE 25 MG/1
25 TABLET, FILM COATED ORAL EVERY 6 HOURS PRN
COMMUNITY
Start: 2018-11-20

## 2018-11-29 NOTE — ASSESSMENT & PLAN NOTE
Left anterior majora with an area of erythema consistent with candida  Advised application of nystatin BID x7d and conservative vulvar skin care  The patient agrees with plan  Recommended avoidance of clobetasol until this course is completed

## 2018-11-29 NOTE — ASSESSMENT & PLAN NOTE
Three well healed sebaceous cysts on left mons  Advised these appear benign in nature and I suspect they are consistent with her prior h/o recurrent sebaceous cysts/folliculitis over mons  I did recommend that she uses mupirocin over this area as well as switching to Dial soap  She agrees to call with any additional lesions, as I have advised culture if possible

## 2018-11-29 NOTE — PROGRESS NOTES
Assessment/Plan:    Sebaceous cyst  Three well healed sebaceous cysts on left mons  Advised these appear benign in nature and I suspect they are consistent with her prior h/o recurrent sebaceous cysts/folliculitis over mons  I did recommend that she uses mupirocin over this area as well as switching to Dial soap  She agrees to call with any additional lesions, as I have advised culture if possible  Candidiasis  Left anterior majora with an area of erythema consistent with candida  Advised application of nystatin BID x7d and conservative vulvar skin care  The patient agrees with plan  Recommended avoidance of clobetasol until this course is completed  Diagnoses and all orders for this visit:    Sebaceous cyst  -     mupirocin (BACTROBAN) 2 % ointment; Apply topically 3 (three) times a day    Candidiasis    Other orders  -     DULoxetine (CYMBALTA) 20 mg capsule; Take 20 mg by mouth daily  -     famotidine (PEPCID) 20 mg tablet; Take 20 mg by mouth 2 (two) times a day  -     hydrOXYzine HCL (ATARAX) 25 mg tablet; Take 25 mg by mouth every 6 (six) hours as needed          Subjective:      Patient ID: Lisha Pisano is a 56 y o  female  This patient presents with c/o vulvar cysts   She was recently admitted to [facility] with cellulitis of a sebaceous cyst on her upper left back  Cultures pos for e coli and cryptosporidium  Currently off abx and fatigued but otherwise feeling well   Since discharge 2 weeks ago she has noticed several sebaceous cysts on left mons  She has longstanding h/o similar recurrent lesions   These have since ruptured and drained   The patient denies gyn complaints otherwise         The following portions of the patient's history were reviewed and updated as appropriate: allergies, current medications, past family history, past medical history, past social history, past surgical history and problem list     Review of Systems   Constitutional: Negative  HENT: Negative  Eyes: Negative  Respiratory: Negative  Cardiovascular: Negative  Gastrointestinal: Negative  Endocrine: Negative  Genitourinary: Negative  Lesions over the left mons    Musculoskeletal: Negative  Skin: Negative  Allergic/Immunologic: Negative  Neurological: Negative  Hematological: Negative  Psychiatric/Behavioral: Negative  Objective:      /86 (BP Location: Left arm, Cuff Size: Large)   Ht 5' 5" (1 651 m)   Wt 105 kg (230 lb 12 8 oz)   BMI 38 41 kg/m²          Physical Exam   Constitutional: She is oriented to person, place, and time  She appears well-developed and well-nourished  HENT:   Head: Normocephalic and atraumatic  Eyes: Pupils are equal, round, and reactive to light  Neck: Normal range of motion  Pulmonary/Chest: Effort normal    Abdominal: Hernia confirmed negative in the right inguinal area and confirmed negative in the left inguinal area  Genitourinary:       There is no rash, tenderness, lesion or injury on the right labia  There is no rash, tenderness, lesion or injury on the left labia  No erythema or bleeding in the vagina  No vaginal discharge found  Musculoskeletal: Normal range of motion  Lymphadenopathy:        Right: No inguinal adenopathy present  Left: No inguinal adenopathy present  Neurological: She is alert and oriented to person, place, and time  Skin: Skin is warm and dry  Psychiatric: She has a normal mood and affect   Her behavior is normal  Judgment and thought content normal

## 2019-07-10 ENCOUNTER — TELEPHONE (OUTPATIENT)
Dept: BARIATRICS | Facility: CLINIC | Age: 49
End: 2019-07-10

## 2019-07-10 NOTE — TELEPHONE ENCOUNTER
called pt to schedule overdue f/u - she stated this is not a good time    ----- Message from Magda Rojo RN sent at 2019  4:57 AM EDT -----  Regardin year f/u appointment  Please call patient to schedule 5 year f/u appointment with our office  Thank you